# Patient Record
Sex: MALE | Race: WHITE
[De-identification: names, ages, dates, MRNs, and addresses within clinical notes are randomized per-mention and may not be internally consistent; named-entity substitution may affect disease eponyms.]

---

## 2018-01-07 ENCOUNTER — HOSPITAL ENCOUNTER (EMERGENCY)
Dept: HOSPITAL 92 - ERS | Age: 63
Discharge: HOME | End: 2018-01-07
Payer: COMMERCIAL

## 2018-01-07 DIAGNOSIS — F43.10: ICD-10-CM
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
## 2018-01-07 DIAGNOSIS — S01.81XA: Primary | ICD-10-CM

## 2018-01-07 DIAGNOSIS — I10: ICD-10-CM

## 2018-01-07 PROCEDURE — 12013 RPR F/E/E/N/L/M 2.6-5.0 CM: CPT

## 2018-01-07 PROCEDURE — 90471 IMMUNIZATION ADMIN: CPT

## 2018-01-07 PROCEDURE — 90715 TDAP VACCINE 7 YRS/> IM: CPT

## 2018-07-16 ENCOUNTER — HOSPITAL ENCOUNTER (OUTPATIENT)
Dept: HOSPITAL 92 - LABBT | Age: 63
Discharge: HOME | End: 2018-07-16
Attending: ORTHOPAEDIC SURGERY
Payer: COMMERCIAL

## 2018-07-16 DIAGNOSIS — Z01.818: Primary | ICD-10-CM

## 2018-07-16 DIAGNOSIS — M75.101: ICD-10-CM

## 2018-07-16 LAB
ANION GAP SERPL CALC-SCNC: 15 MMOL/L (ref 10–20)
BASOPHILS # BLD AUTO: 0.1 THOU/UL (ref 0–0.2)
BASOPHILS NFR BLD AUTO: 1.4 % (ref 0–1)
BUN SERPL-MCNC: 14 MG/DL (ref 8.4–25.7)
CALCIUM SERPL-MCNC: 9.4 MG/DL (ref 7.8–10.44)
CHLORIDE SERPL-SCNC: 101 MMOL/L (ref 98–107)
CO2 SERPL-SCNC: 23 MMOL/L (ref 22–29)
CREAT CL PREDICTED SERPL C-G-VRATE: 0 ML/MIN (ref 70–130)
EOSINOPHIL # BLD AUTO: 0.3 THOU/UL (ref 0–0.7)
EOSINOPHIL NFR BLD AUTO: 3.2 % (ref 0–10)
GLUCOSE SERPL-MCNC: 415 MG/DL (ref 70–105)
HGB BLD-MCNC: 15 G/DL (ref 14–18)
LYMPHOCYTES # BLD: 2.8 THOU/UL (ref 1.2–3.4)
LYMPHOCYTES NFR BLD AUTO: 32.8 % (ref 21–51)
MCH RBC QN AUTO: 30.8 PG (ref 27–31)
MCV RBC AUTO: 87.4 FL (ref 78–98)
MONOCYTES # BLD AUTO: 0.5 THOU/UL (ref 0.11–0.59)
MONOCYTES NFR BLD AUTO: 5.8 % (ref 0–10)
NEUTROPHILS # BLD AUTO: 4.8 THOU/UL (ref 1.4–6.5)
NEUTROPHILS NFR BLD AUTO: 56.9 % (ref 42–75)
PLATELET # BLD AUTO: 233 THOU/UL (ref 130–400)
POTASSIUM SERPL-SCNC: 3.9 MMOL/L (ref 3.5–5.1)
RBC # BLD AUTO: 4.89 MILL/UL (ref 4.7–6.1)
SODIUM SERPL-SCNC: 135 MMOL/L (ref 136–145)
WBC # BLD AUTO: 8.4 THOU/UL (ref 4.8–10.8)

## 2018-07-16 PROCEDURE — 93010 ELECTROCARDIOGRAM REPORT: CPT

## 2018-07-16 PROCEDURE — 80048 BASIC METABOLIC PNL TOTAL CA: CPT

## 2018-07-16 PROCEDURE — 93005 ELECTROCARDIOGRAM TRACING: CPT

## 2018-07-16 PROCEDURE — 85025 COMPLETE CBC W/AUTO DIFF WBC: CPT

## 2018-07-20 ENCOUNTER — HOSPITAL ENCOUNTER (OUTPATIENT)
Dept: HOSPITAL 92 - SDC | Age: 63
Discharge: HOME | End: 2018-07-20
Attending: ORTHOPAEDIC SURGERY
Payer: COMMERCIAL

## 2018-07-20 VITALS — BODY MASS INDEX: 25 KG/M2

## 2018-07-20 DIAGNOSIS — M75.111: Primary | ICD-10-CM

## 2018-07-20 DIAGNOSIS — M75.21: ICD-10-CM

## 2018-07-20 DIAGNOSIS — Z79.899: ICD-10-CM

## 2018-07-20 DIAGNOSIS — S46.111A: ICD-10-CM

## 2018-07-20 PROCEDURE — 0LQ14ZZ REPAIR RIGHT SHOULDER TENDON, PERCUTANEOUS ENDOSCOPIC APPROACH: ICD-10-PCS | Performed by: ORTHOPAEDIC SURGERY

## 2018-07-20 PROCEDURE — 36416 COLLJ CAPILLARY BLOOD SPEC: CPT

## 2018-07-20 PROCEDURE — 0RNJ4ZZ RELEASE RIGHT SHOULDER JOINT, PERCUTANEOUS ENDOSCOPIC APPROACH: ICD-10-PCS | Performed by: ORTHOPAEDIC SURGERY

## 2018-07-20 PROCEDURE — C1713 ANCHOR/SCREW BN/BN,TIS/BN: HCPCS

## 2018-07-20 NOTE — OP
DATE OF PROCEDURE:  07/20/2018

 

PREOPERATIVE DIAGNOSES:  Partial subscapularis tear, rotator cuff tear, dislocation of the biceps ten
don with biceps tendonitis.

 

POSTOPERATIVE DIAGNOSES:  Partial subscapularis tear, rotator cuff tear, dislocation of the biceps te
ndon with biceps tendonitis.

 

PROCEDURES:  Arthroscopic biceps tenotomy, arthroscopic rotator cuff repair, arthroscopic subacromial
 decompression.

 

SURGEON:  Jermaine Santoyo M.D.

 

ANESTHESIA:  General.

 

BLOOD LOSS:  Minimal.

 

SPECIMEN:  None.

 

DRAINS:  None.

 

COMPLICATIONS:  None.

 

IMPLANTS USED:  Two corkscrew suture anchors and 2 SwiveLock suture anchors.  

 

PROCEDURE IN DETAIL:  The patient was taken to the operating where general anesthesia was induced.  T
he patient was placed in left lateral decubitus position.  Right arm was placed in traction, prepped 
and draped in the usual sterile fashion.  Scope was placed in the glenohumeral joint.  I tagged his b
iceps anteriorly.  This was very far dislocated and attenuated.  The joint was inspected.  There was 
no significant arthritis.  The scope was placed in the subacromial bursa and subacromial decompressio
n was performed.  Hemostasis was obtained as needed.  I freshened up the rotator cuff tear, I freshen
ed up the greater tuberosity.  I delivered the biceps tendon out of the rotator cuff tear with a tag 
suture.  Rotator cuff was then repaired using the suture anchors medial row and then double reinforce
d with a double row type technique.  There was a very good repair of the rotator cuff, there was  a v
amna snug repair, although I cannot say it is completely watertight.  The biceps tendon, I attempted t
o do a tenodesis, but the tendon tissue was too poor condition to hold sutures.  Therefore, I just pe
rformed a tenotomy.  The shoulder was then drained.  Nylon sutures were applied and sterile dressings
 applied.  There were no complications.

## 2019-08-09 ENCOUNTER — HOSPITAL ENCOUNTER (OUTPATIENT)
Dept: HOSPITAL 92 - BICULT | Age: 64
Discharge: HOME | End: 2019-08-09
Attending: INTERNAL MEDICINE
Payer: COMMERCIAL

## 2019-08-09 DIAGNOSIS — N18.3: Primary | ICD-10-CM

## 2019-08-09 PROCEDURE — 76770 US EXAM ABDO BACK WALL COMP: CPT

## 2019-08-09 NOTE — ULT
BILATERAL RENAL ULTRASOUND:



HISTORY: Chronic renal disease



FINDINGS:

The right kidney measures 11 cm in length and the left kidney measures 11.8 cm in length. No focal ma
ss or hydronephrosis is seen on either side. Cortical echogenicity and thickness is normal. No

shadowing calculi are identified.



The bladder is not satisfactorily distended. The patient emptied his bladder prior to the exam.



IMPRESSION: Normal renal sonogram.



Reported By: Beau Luis 

Electronically Signed:  8/9/2019 3:14 PM